# Patient Record
Sex: FEMALE | Race: BLACK OR AFRICAN AMERICAN
[De-identification: names, ages, dates, MRNs, and addresses within clinical notes are randomized per-mention and may not be internally consistent; named-entity substitution may affect disease eponyms.]

---

## 2019-10-07 ENCOUNTER — HOSPITAL ENCOUNTER (INPATIENT)
Dept: HOSPITAL 72 - EMR | Age: 34
LOS: 3 days | Discharge: HOME | DRG: 101 | End: 2019-10-10
Payer: COMMERCIAL

## 2019-10-07 VITALS — WEIGHT: 157.75 LBS | BODY MASS INDEX: 23.91 KG/M2 | HEIGHT: 68 IN

## 2019-10-07 VITALS — SYSTOLIC BLOOD PRESSURE: 131 MMHG | DIASTOLIC BLOOD PRESSURE: 75 MMHG

## 2019-10-07 VITALS — SYSTOLIC BLOOD PRESSURE: 126 MMHG | DIASTOLIC BLOOD PRESSURE: 69 MMHG

## 2019-10-07 VITALS — DIASTOLIC BLOOD PRESSURE: 81 MMHG | SYSTOLIC BLOOD PRESSURE: 118 MMHG

## 2019-10-07 VITALS — SYSTOLIC BLOOD PRESSURE: 120 MMHG | DIASTOLIC BLOOD PRESSURE: 75 MMHG

## 2019-10-07 DIAGNOSIS — K50.90: ICD-10-CM

## 2019-10-07 DIAGNOSIS — R56.9: Primary | ICD-10-CM

## 2019-10-07 DIAGNOSIS — E86.0: ICD-10-CM

## 2019-10-07 LAB
ADD MANUAL DIFF: YES
ALBUMIN SERPL-MCNC: 3.9 G/DL (ref 3.4–5)
ALBUMIN/GLOB SERPL: 1.1 {RATIO} (ref 1–2.7)
ALP SERPL-CCNC: 65 U/L (ref 46–116)
ALT SERPL-CCNC: 16 U/L (ref 12–78)
ANION GAP SERPL CALC-SCNC: 10 MMOL/L (ref 5–15)
APPEARANCE UR: CLEAR
APTT PPP: (no result) S
AST SERPL-CCNC: 13 U/L (ref 15–37)
BILIRUB SERPL-MCNC: 0.3 MG/DL (ref 0.2–1)
BUN SERPL-MCNC: 8 MG/DL (ref 7–18)
CALCIUM SERPL-MCNC: 8.8 MG/DL (ref 8.5–10.1)
CHLORIDE SERPL-SCNC: 104 MMOL/L (ref 98–107)
CO2 SERPL-SCNC: 26 MMOL/L (ref 21–32)
CREAT SERPL-MCNC: 0.8 MG/DL (ref 0.55–1.3)
ERYTHROCYTE [DISTWIDTH] IN BLOOD BY AUTOMATED COUNT: 10.6 % (ref 11.6–14.8)
GLOBULIN SER-MCNC: 3.7 G/DL
GLUCOSE UR STRIP-MCNC: NEGATIVE MG/DL
HCT VFR BLD CALC: 41.8 % (ref 37–47)
HGB BLD-MCNC: 13.9 G/DL (ref 12–16)
KETONES UR QL STRIP: NEGATIVE
LEUKOCYTE ESTERASE UR QL STRIP: NEGATIVE
MCV RBC AUTO: 96 FL (ref 80–99)
NITRITE UR QL STRIP: NEGATIVE
PH UR STRIP: 6.5 [PH] (ref 4.5–8)
PLATELET # BLD: 273 K/UL (ref 150–450)
POTASSIUM SERPL-SCNC: 4 MMOL/L (ref 3.5–5.1)
PROT UR QL STRIP: NEGATIVE
RBC # BLD AUTO: 4.36 M/UL (ref 4.2–5.4)
SODIUM SERPL-SCNC: 140 MMOL/L (ref 136–145)
SP GR UR STRIP: 1 (ref 1–1.03)
UROBILINOGEN UR-MCNC: NORMAL MG/DL (ref 0–1)
WBC # BLD AUTO: 9.5 K/UL (ref 4.8–10.8)

## 2019-10-07 PROCEDURE — 99285 EMERGENCY DEPT VISIT HI MDM: CPT

## 2019-10-07 PROCEDURE — 70553 MRI BRAIN STEM W/O & W/DYE: CPT

## 2019-10-07 PROCEDURE — 96361 HYDRATE IV INFUSION ADD-ON: CPT

## 2019-10-07 PROCEDURE — 93005 ELECTROCARDIOGRAM TRACING: CPT

## 2019-10-07 PROCEDURE — 81025 URINE PREGNANCY TEST: CPT

## 2019-10-07 PROCEDURE — 83690 ASSAY OF LIPASE: CPT

## 2019-10-07 PROCEDURE — 83735 ASSAY OF MAGNESIUM: CPT

## 2019-10-07 PROCEDURE — 86592 SYPHILIS TEST NON-TREP QUAL: CPT

## 2019-10-07 PROCEDURE — 96365 THER/PROPH/DIAG IV INF INIT: CPT

## 2019-10-07 PROCEDURE — 81003 URINALYSIS AUTO W/O SCOPE: CPT

## 2019-10-07 PROCEDURE — 80307 DRUG TEST PRSMV CHEM ANLYZR: CPT

## 2019-10-07 PROCEDURE — 70450 CT HEAD/BRAIN W/O DYE: CPT

## 2019-10-07 PROCEDURE — 36415 COLL VENOUS BLD VENIPUNCTURE: CPT

## 2019-10-07 PROCEDURE — 85007 BL SMEAR W/DIFF WBC COUNT: CPT

## 2019-10-07 PROCEDURE — 80299 QUANTITATIVE ASSAY DRUG: CPT

## 2019-10-07 PROCEDURE — 95819 EEG AWAKE AND ASLEEP: CPT

## 2019-10-07 PROCEDURE — 84703 CHORIONIC GONADOTROPIN ASSAY: CPT

## 2019-10-07 PROCEDURE — 86780 TREPONEMA PALLIDUM: CPT

## 2019-10-07 PROCEDURE — 80053 COMPREHEN METABOLIC PANEL: CPT

## 2019-10-07 PROCEDURE — 80048 BASIC METABOLIC PNL TOTAL CA: CPT

## 2019-10-07 PROCEDURE — 85025 COMPLETE CBC W/AUTO DIFF WBC: CPT

## 2019-10-07 PROCEDURE — 96375 TX/PRO/DX INJ NEW DRUG ADDON: CPT

## 2019-10-07 RX ADMIN — ENOXAPARIN SODIUM SCH MG: 40 INJECTION SUBCUTANEOUS at 21:15

## 2019-10-07 NOTE — NUR
ED Nurse Note:



Pt continues to rest quietly in bed, awake and alert, IV site patent, denies 
cp, sob or any acute chagnes, no sz activity noted, report given to FATOUMATA Cordova on 
floor unit, pt being taken to floor via gurney with RN and ER-Tech under ACLS 
guidelines, nad noted during pt transport.

## 2019-10-07 NOTE — NUR
NURSE NOTES:

rECEIVED PT FROM catracho Cooney VIA PHONE. Pt arrived awake, alert, and talkative. Family at 
bedside. Skin intact. Pt admits to drug use. Bed in lowest position. Call light within 
reach. Will continue to monitor.

## 2019-10-07 NOTE — EMERGENCY ROOM REPORT
History of Present Illness


General


Chief Complaint:  Syncope


Source:  EMS





Present Illness


HPI


33-year-old female presents ED for evaluation.  Patient brought in by EMS.  

States that while at work today she started to feel lightheaded and fell to the 

ground.  Witnessed syncope per coworkers.  Patient presenting with headache and 

vomiting.  Headache is posterior, dull, 9 out of 10, nonradiating.  Denies 

photophobia blurry vision.  Denies neck pain.  History of Crohn's disease.  

States she is not taking any medication at this time.  States her last flareup 

was 2 years ago.  Denies any abdominal pain.  Denies any diarrhea.  No other 

aggravating relieving factors.  Denies any other associated symptoms


Allergies:  


Coded Allergies:  


     No Known Allergies (Unverified , 10/7/19)





Patient History


Past Surgical History:  none


Pertinent Family History:  none


Social History:  Denies: smoking, alcohol use, drug use


Last Menstrual Period:  09/09/19


Pregnant Now:  No


Immunizations:  UTD


Reviewed Nursing Documentation:  PMH: Agreed; PSxH: Agreed





Nursing Documentation-PMH


Past Medical History:  No History, Except For


Hx Gastrointestinal Problems:  Yes - chron diseases





Review of Systems


All Other Systems:  negative except mentioned in HPI





Physical Exam





Vital Signs








  Date Time  Temp Pulse Resp B/P (MAP) Pulse Ox O2 Delivery O2 Flow Rate FiO2


 


10/7/19 16:27 97.9 86 16 117/73 (88) 99 Room Air  








Sp02 EP Interpretation:  reviewed, normal


General Appearance:  no apparent distress, alert, GCS 15, non-toxic


Head:  normocephalic, atraumatic


Eyes:  bilateral eye normal inspection, bilateral eye PERRL


ENT:  hearing grossly normal, normal pharynx, no angioedema, normal voice


Neck:  full range of motion, supple, no meningismus, supple/symm/no masses


Respiratory:  chest non-tender, lungs clear, normal breath sounds, speaking 

full sentences


Cardiovascular #1:  regular rate, rhythm, no edema


Cardiovascular #2:  2+ carotid (R), 2+ carotid (L), 2+ radial (R), 2+ radial (L)

, 2+ dorsalis pedis (R), 2+ dorsalis pedis (L)


Gastrointestinal:  normal bowel sounds, non tender, soft, non-distended, no 

guarding, no rebound


Rectal:  deferred


Genitourinary:  normal inspection, no CVA tenderness


Musculoskeletal:  back normal, gait/station normal, normal range of motion, non-

tender


Neurologic:  alert, oriented x3, responsive, motor strength/tone normal, 

sensory intact, speech normal


Psychiatric:  judgement/insight normal, memory normal, mood/affect normal, no 

suicidal/homicidal ideation


Reflexes:  3+ bicep (R), 3+ bicep (L), 3+ tricep (R), 3+ tricep (L), 3+ knee (R)

, 3+ knee (L)


Lymphatic:  no adenopathy





Medical Decision Making


Diagnostic Impression:  


 Primary Impression:  


 Syncope


 Qualified Codes:  R55 - Syncope and collapse


 Additional Impression:  


 New onset seizure


ER Course


Hospital Course 





34 yo F presents with nausea, syncope, headache. 





Differential diagnosis includes- dehydration, substance abuse, arrythmia





Clinical course


Patient placed on stretcher.  Initial history and physical I ordered labs, IV 

fluids, mediations, CT head 





Labs-electrolytes okay, no leukocytosis, hemoglobin/hematocrit stable.  UDS + 

THC, UA negative


EKG - NSr, no acute ischemic chanves interpreted by me 


CT Brain ok 





States she still feels nauseous and shaky.  Coworker at bedside states that 

patient had a seizure that she witnessed.  Saw foaming from the mouth.  No 

incontinence.  No tongue trauma.  Patient has no prior history of seizure. 





Given Keppra in ED.  Case discussed with Dr. Hale and he agreed to accept 

the patient to his service for further care and support.





i.  I feel this is a highly complex case requiring extensive working including 

EKG/Rhythm strip, Xray/CT/US, Blood/urine lab work, repeat exams while in ED, 


and administration of strong opiates/narcotics for pain control, admission to 

hospital or close patient follow up.  





Diagnosis - syncope, new onset seizure





admitted to telemetry in serious condition





Labs








Test


  10/7/19


17:00 10/7/19


18:15


 


White Blood Count


  9.5 K/UL


(4.8-10.8) 


 


 


Red Blood Count


  4.36 M/UL


(4.20-5.40) 


 


 


Hemoglobin


  13.9 G/DL


(12.0-16.0) 


 


 


Hematocrit


  41.8 %


(37.0-47.0) 


 


 


Mean Corpuscular Volume 96 FL (80-99)  


 


Mean Corpuscular Hemoglobin


  31.8 PG


(27.0-31.0) 


 


 


Mean Corpuscular Hemoglobin


Concent 33.3 G/DL


(32.0-36.0) 


 


 


Red Cell Distribution Width


  10.6 %


(11.6-14.8) 


 


 


Platelet Count


  273 K/UL


(150-450) 


 


 


Mean Platelet Volume


  6.6 FL


(6.5-10.1) 


 


 


Neutrophils (%) (Auto)  % (45.0-75.0)  


 


Lymphocytes (%) (Auto)  % (20.0-45.0)  


 


Monocytes (%) (Auto)  % (1.0-10.0)  


 


Eosinophils (%) (Auto)  % (0.0-3.0)  


 


Basophils (%) (Auto)  % (0.0-2.0)  


 


Differential Total Cells


Counted 100 


  


 


 


Neutrophils % (Manual) 83 % (45-75)  


 


Lymphocytes % (Manual) 12 % (20-45)  


 


Monocytes % (Manual) 5 % (1-10)  


 


Eosinophils % (Manual) 0 % (0-3)  


 


Basophils % (Manual) 0 % (0-2)  


 


Band Neutrophils 0 % (0-8)  


 


Platelet Estimate Adequate  


 


Platelet Morphology Normal  


 


Red Blood Cell Morphology Normal  


 


Sodium Level


  140 MMOL/L


(136-145) 


 


 


Potassium Level


  4.0 MMOL/L


(3.5-5.1) 


 


 


Chloride Level


  104 MMOL/L


() 


 


 


Carbon Dioxide Level


  26 MMOL/L


(21-32) 


 


 


Anion Gap


  10 mmol/L


(5-15) 


 


 


Blood Urea Nitrogen 8 mg/dL (7-18)  


 


Creatinine


  0.8 MG/DL


(0.55-1.30) 


 


 


Estimat Glomerular Filtration


Rate > 60 mL/min


(>60) 


 


 


Glucose Level


  161 MG/DL


() 


 


 


Calcium Level


  8.8 MG/DL


(8.5-10.1) 


 


 


Total Bilirubin


  0.3 MG/DL


(0.2-1.0) 


 


 


Aspartate Amino Transf


(AST/SGOT) 13 U/L (15-37) 


  


 


 


Alanine Aminotransferase


(ALT/SGPT) 16 U/L (12-78) 


  


 


 


Alkaline Phosphatase


  65 U/L


() 


 


 


Total Protein


  7.6 G/DL


(6.4-8.2) 


 


 


Albumin


  3.9 G/DL


(3.4-5.0) 


 


 


Globulin 3.7 g/dL  


 


Albumin/Globulin Ratio 1.1 (1.0-2.7)  


 


Lipase


  127 U/L


() 


 


 


Human Chorionic Gonadotropin,


Qual Negative


(NEGATIVE) 


 


 


Urine Color  Pale yellow 


 


Urine Appearance  Clear 


 


Urine pH  6.5 (4.5-8.0) 


 


Urine Specific Gravity


  


  1.005


(1.005-1.035)


 


Urine Protein


  


  Negative


(NEGATIVE)


 


Urine Glucose (UA)


  


  Negative


(NEGATIVE)


 


Urine Ketones


  


  Negative


(NEGATIVE)


 


Urine Blood


  


  Negative


(NEGATIVE)


 


Urine Nitrite


  


  Negative


(NEGATIVE)


 


Urine Bilirubin


  


  Negative


(NEGATIVE)


 


Urine Urobilinogen


  


  Normal MG/DL


(0.0-1.0)


 


Urine Leukocyte Esterase


  


  Negative


(NEGATIVE)


 


Urine HCG, Qualitative


  


  Negative


(NEGATIVE)


 


Urine Opiates Screen


  


  Positive


(NEGATIVE)


 


Urine Barbiturates Screen


  


  Negative


(NEGATIVE)


 


Phencyclidine (PCP) Screen


  


  Negative


(NEGATIVE)


 


Urine Amphetamines Screen


  


  Negative


(NEGATIVE)


 


Urine Benzodiazepines Screen


  


  Negative


(NEGATIVE)


 


Urine Cocaine Screen


  


  Negative


(NEGATIVE)


 


Urine Marijuana (THC) Screen


  


  Positive


(NEGATIVE)








EKG Diagnostic Results


Rate:  normal


Rhythm:  NSR


ST Segments:  no acute changes


ASA given to the pt in ED:  No





Rhythm Strip Diag. Results


EP Interpretation:  yes


Rhythm:  NSR, no PVC's, no ectopy





CT/MRI/US Diagnostic Results


CT/MRI/US Diagnostic Results :  


   Imaging Test Ordered:  CT head


   Impression


Impression:


-No acute intracranial abnormality.


-Bilateral maxillary sinus findings suggesting probable chronic sinusitis,


 likely acute on the left.


-Otherwise unremarkable study.





Last Vital Signs








  Date Time  Temp Pulse Resp B/P (MAP) Pulse Ox O2 Delivery O2 Flow Rate FiO2


 


10/7/19 16:27 97.9 86 16 117/73 (88) 99 Room Air  








Status:  improved


Disposition:  ADMITTED AS INPATIENT


Condition:  Serious


Scripts


No Active Prescriptions or Reported Meds











Lázaro Encinas MD Oct 7, 2019 17:08

## 2019-10-07 NOTE — NUR
ED Nurse Note:



Pt assisted to bathroom, ambulates well with steady gait, denies headache, or 
dizziness,r emains with mild abd pain, md aware, no cp, no sob, iv site patent, 
no sz activity noted, pt to be admitted to hospital, will resume care as 
ordered and continue to closely monitor.

## 2019-10-07 NOTE — NUR
ED Nurse Note:



Recieved report from am nurse to resume care, pt in bed awake, alert and 
oriented x 4, pt on cardiac monitoring, pt here for possible seizure activity 
with syncopal episode, pt has patent saline lock in left ac, side rails padded 
and placed on sz precautions, pt family at bedside, pt ahs hx of chrohns 
disease and has mild abd pain at 7/10 with nausea, will resume care as ordered 
and continue to closely monitor.

## 2019-10-08 VITALS — DIASTOLIC BLOOD PRESSURE: 92 MMHG | SYSTOLIC BLOOD PRESSURE: 141 MMHG

## 2019-10-08 VITALS — SYSTOLIC BLOOD PRESSURE: 116 MMHG | DIASTOLIC BLOOD PRESSURE: 82 MMHG

## 2019-10-08 VITALS — SYSTOLIC BLOOD PRESSURE: 120 MMHG | DIASTOLIC BLOOD PRESSURE: 78 MMHG

## 2019-10-08 VITALS — DIASTOLIC BLOOD PRESSURE: 79 MMHG | SYSTOLIC BLOOD PRESSURE: 122 MMHG

## 2019-10-08 LAB
ANION GAP SERPL CALC-SCNC: 5 MMOL/L (ref 5–15)
BUN SERPL-MCNC: 5 MG/DL (ref 7–18)
CALCIUM SERPL-MCNC: 8 MG/DL (ref 8.5–10.1)
CHLORIDE SERPL-SCNC: 109 MMOL/L (ref 98–107)
CO2 SERPL-SCNC: 28 MMOL/L (ref 21–32)
CREAT SERPL-MCNC: 0.7 MG/DL (ref 0.55–1.3)
POTASSIUM SERPL-SCNC: 3.3 MMOL/L (ref 3.5–5.1)
SODIUM SERPL-SCNC: 142 MMOL/L (ref 136–145)

## 2019-10-08 RX ADMIN — ENOXAPARIN SODIUM SCH MG: 40 INJECTION SUBCUTANEOUS at 20:34

## 2019-10-08 NOTE — HISTORY AND PHYSICAL REPORT
DATE OF ADMISSION:  10/07/2019

REASON FOR ADMISSION:  Seizure.



HISTORY OF PRESENT ILLNESS:  The patient is a 33-year-old female who

presented to the emergency room for further evaluation and care after

falling to the ground with having a witnessed seizure, which she says her

coworkers said it lasted about a minute.  There was a prodromal flare

where she made some aggravating noises, fell over to the side and had a

seizure for one minute.  Per her recollection of what her coworkers told

her.  The patient states she has never had this before.  The patient does

state she does marijuana on a regular basis by vaping and eating it and

also smoking in other routes.  However, at this time the patient stated

that she had not had some for some days.  She denies any nausea, vomiting,

or diarrhea.  No chest pain or shortness of breath.



PAST MEDICAL HISTORY:  Crohn disease.



ALLERGIES:  No known drug allergies.



PAST SURGICAL HISTORY:  None.



FAMILY HISTORY:  None.



SOCIAL HISTORY:  Positive for tobacco, alcohol, and marijuana.



REVIEW OF SYSTEMS:  NEUROLOGIC:  The patient had syncopal and seizure.

CARDIOVASCULAR:  No current chest pain, palpitations, or angina.

PULMONARY:  No difficulty breathing, productive cough, or sputum.

GASTROINTESTINAL/GENITOURINARY:  No change in urinary or bowel habits.  No

nausea, vomiting, or diarrhea.  ENDOCRINOLOGY:  No night sweats, fevers,

or chills.  MUSCULOSKELETAL:  The patient is feeling weak, tired, and

fatigued.



PHYSICAL EXAMINATION:

VITAL SIGNS:  Blood pressure 126/69, heart rate 76, temperature 98.4, and

respiratory rate 16.  Pulse oximetry 99% on room air.

GENERAL:  The patient is awake and alert, not in distress.

HEENT:  Extraocular muscles intact.  No lymphadenopathy noted.

CARDIOVASCULAR:  S1, S2.  No rubs or gallops.

PULMONARY:  Clear to auscultation bilaterally.  No rales, rhonchi, or

wheezes.

ABDOMEN:  Nondistended and nontender.

EXTREMITIES:  No edema.



LABORATORY DATA:  Labs dated October 7, 2019 - toxicology screen is

positive for marijuana and opioids.  Potassium 4, creatinine 0.8, sodium

140.  Hemoglobin 13.9, white cell count 9.5, and platelet count 273,000.



ASSESSMENT AND PLAN:

1. Seizure.  It could be secondary to heavy marijuana use.  At this

time, Neurology has been consulted.  We will defer further management to

them.

2. DVT prophylaxis with Lovenox.

3. GI prophylaxis with famotidine.

4. Dehydration.  Continue IV fluids.









  ______________________________________________

  Elder Hale MD





DR:  HDP/VR

D:  10/07/2019 22:08

T:  10/08/2019 02:29

JOB#:  [id number]

CC:

## 2019-10-08 NOTE — NUR
NURSE NOTES:

Received patient from FATOUMATA Cordova in bed, denies any pain at this time. Patient is AAO X4, able 
to make needs known. Denies any pain at this time. Bed is in lowest position, brakes engaged 
for safety. Call light is within reach. Will continue with the plan of care.

## 2019-10-08 NOTE — NUR
*-* INSURANCE *-*



ALL AVAILABLE CLINICALS HAVE BEEN FAXED TO:



[facility]

TRACKING#[id number] 

NO CM ASSIGNED YET 

#[phone]

FAX#[phone] REVIEWS/CLINICALS

## 2019-10-08 NOTE — NEPHROLOGY PROGRESS NOTE
Assessment/Plan


Assessment/Plan:


A/P





1)  Seizure- new onset


      - marijuanna use


      - DC once cleared by Neuro





2) DVT prophylaxsis with lovenox





Subjective


Date patient seen:  Oct 8, 2019


Time patient seen:  08:28


ROS Limited/Unobtainable:  No


Allergies:  


Coded Allergies:  


     No Known Allergies (Unverified , 10/7/19)


Subjective


Patient in no distress





Objective





Last 24 Hour Vital Signs








  Date Time  Temp Pulse Resp B/P (MAP) Pulse Ox O2 Delivery O2 Flow Rate FiO2


 


10/8/19 04:00  57      


 


10/8/19 00:00  62      


 


10/7/19 23:34      Room Air  


 


10/7/19 22:51  68  118/81 (93) 98   


 


10/7/19 22:25 98.4 71 16 131/75 100 Room Air  


 


10/7/19 21:00 98.4 71 16 131/75 100 Room Air  


 


10/7/19 19:30 98.4 76 16 126/69 99 Room Air  


 


10/7/19 17:40 97.9       


 


10/7/19 17:35 98.2 70 16 120/75 99 Room Air  


 


10/7/19 16:27 97.9 86 16 117/73 (88) 99 Room Air  

















Intake and Output  


 


 10/7/19 10/8/19





 19:00 07:00


 


Intake Total 1000 ml 


 


Balance 1000 ml 


 


  


 


Intake IV Total 1000 ml 


 


# Voids  1








Laboratory Tests


10/7/19 17:00: 


White Blood Count 9.5, Red Blood Count 4.36, Hemoglobin 13.9, Hematocrit 41.8, 

Mean Corpuscular Volume 96, Mean Corpuscular Hemoglobin 31.8H, Mean Corpuscular 

Hemoglobin Concent 33.3, Red Cell Distribution Width 10.6L, Platelet Count 273, 

Mean Platelet Volume 6.6, Neutrophils (%) (Auto) , Lymphocytes (%) (Auto) , 

Monocytes (%) (Auto) , Eosinophils (%) (Auto) , Basophils (%) (Auto) , 

Differential Total Cells Counted 100, Neutrophils % (Manual) 83H, Lymphocytes % 

(Manual) 12L, Monocytes % (Manual) 5, Eosinophils % (Manual) 0, Basophils % (

Manual) 0, Band Neutrophils 0, Platelet Estimate Adequate, Platelet Morphology 

Normal, Red Blood Cell Morphology Normal, Sodium Level 140, Potassium Level 4.0

, Chloride Level 104, Carbon Dioxide Level 26, Anion Gap 10, Blood Urea 

Nitrogen 8, Creatinine 0.8, Estimat Glomerular Filtration Rate > 60, Glucose 

Level 161H, Calcium Level 8.8, Total Bilirubin 0.3, Aspartate Amino Transf (AST/

SGOT) 13L, Alanine Aminotransferase (ALT/SGPT) 16, Alkaline Phosphatase 65, 

Total Protein 7.6, Albumin 3.9, Globulin 3.7, Albumin/Globulin Ratio 1.1, 

Lipase 127, Human Chorionic Gonadotropin, Qual Negative


10/7/19 18:15: 


Urine Color Pale yellow, Urine Appearance Clear, Urine pH 6.5, Urine Specific 

Gravity 1.005, Urine Protein Negative, Urine Glucose (UA) Negative, Urine 

Ketones Negative, Urine Blood Negative, Urine Nitrite Negative, Urine Bilirubin 

Negative, Urine Urobilinogen Normal, Urine Leukocyte Esterase Negative, Urine 

HCG, Qualitative Negative, Urine Opiates Screen PositiveH, Urine Barbiturates 

Screen Negative, Phencyclidine (PCP) Screen Negative, Urine Amphetamines Screen 

Negative, Urine Benzodiazepines Screen Negative, Urine Cocaine Screen Negative, 

Urine Marijuana (THC) Screen PositiveH


10/8/19 03:50: 


Sodium Level 142, Potassium Level 3.3L, Chloride Level 109H, Carbon Dioxide 

Level 28, Anion Gap 5, Blood Urea Nitrogen 5L, Creatinine 0.7, Estimat 

Glomerular Filtration Rate > 60, Glucose Level 91, Calcium Level 8.0L


Height (Feet):  5


Height (Inches):  8.00


Weight (Pounds):  135


General Appearance:  no apparent distress, alert


EENT:  normal ENT inspection


Neck:  normal alignment, supple


Cardiovascular:  normal rate, regular rhythm


Respiratory/Chest:  lungs clear, normal breath sounds


Abdomen:  non tender, soft


Edema:  no edema noted Arm (L), no edema noted Arm (R), no edema noted Leg (L), 

no edema noted Leg (R), no edema noted Pedal (L), no edema noted Pedal (R), no 

edema noted Generalized











Elder Hale MD Oct 8, 2019 08:29

## 2019-10-08 NOTE — CARDIOLOGY REPORT
--------------- APPROVED REPORT --------------





EKG Measurement

Heart [id number]

OK [id number]

[id number]

[id number]

[id number]





Normal sinus rhythm

Normal ECG

## 2019-10-08 NOTE — NUR
CASE MANAGEMENT:REVIEW



33 YR OLD FEMALE BIBA FROM WORK



CC: SYNCOPE. HEADACHE



SI: NEW ONSET SEIZURE

97.9   86  16   117/73  99% ON RA

GLUCOSE+161   URINE(+) OPIATES AND MARIHUANA



IS: IV REGLAN

1L NS BOLUS

IV MORPHINE 

IV KEPPRA

CT HEAD

**: TO TELEMETRY 



PLAN:

MRI BRAIN

EEG

SEIZURE PRECAUTION

## 2019-10-08 NOTE — NUR
NURSE NOTES:  Received patient from [facility].  Patient in bed, on room air, no signs of 
respiratory distress.  Padded side rails for seizure precaution.  Bed in low position, 
locked, call light within reach.  Patient is alert and oriented x4, no c/o pain.

## 2019-10-09 VITALS — SYSTOLIC BLOOD PRESSURE: 121 MMHG | DIASTOLIC BLOOD PRESSURE: 84 MMHG

## 2019-10-09 VITALS — SYSTOLIC BLOOD PRESSURE: 129 MMHG | DIASTOLIC BLOOD PRESSURE: 79 MMHG

## 2019-10-09 VITALS — DIASTOLIC BLOOD PRESSURE: 70 MMHG | SYSTOLIC BLOOD PRESSURE: 119 MMHG

## 2019-10-09 VITALS — DIASTOLIC BLOOD PRESSURE: 81 MMHG | SYSTOLIC BLOOD PRESSURE: 118 MMHG

## 2019-10-09 VITALS — DIASTOLIC BLOOD PRESSURE: 79 MMHG | SYSTOLIC BLOOD PRESSURE: 111 MMHG

## 2019-10-09 VITALS — DIASTOLIC BLOOD PRESSURE: 78 MMHG | SYSTOLIC BLOOD PRESSURE: 116 MMHG

## 2019-10-09 LAB
ANION GAP SERPL CALC-SCNC: 4 MMOL/L (ref 5–15)
BUN SERPL-MCNC: 4 MG/DL (ref 7–18)
CALCIUM SERPL-MCNC: 8.7 MG/DL (ref 8.5–10.1)
CHLORIDE SERPL-SCNC: 109 MMOL/L (ref 98–107)
CO2 SERPL-SCNC: 28 MMOL/L (ref 21–32)
CREAT SERPL-MCNC: 0.8 MG/DL (ref 0.55–1.3)
POTASSIUM SERPL-SCNC: 4.2 MMOL/L (ref 3.5–5.1)
SODIUM SERPL-SCNC: 141 MMOL/L (ref 136–145)

## 2019-10-09 RX ADMIN — ENOXAPARIN SODIUM SCH MG: 40 INJECTION SUBCUTANEOUS at 21:53
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
## 2019-10-09 NOTE — PROGRESS NOTE
DATE:  10/09/2019

SUBJECTIVE:  The patient last night had a seizure.  There was no actual

latent seizure _____ she had maximum things off of her bed.  The patient

has no recollection of the event.  There was no bitten tongue, myalgias,

incontinence, headache, or confusion.  This morning, she feels well.



PHYSICAL EXAMINATION:

MENTAL STATUS:  Her mental status was intact.  She did spell world

backwards and forwards.  Knows the date is 10/09/2019, it is Wednesday.

Language function is normal.  Cranial nerves II through XII are within

normal limits.

MUSCLE EXAMINATION:  Muscle bulk and tone are normal.  Strength 5/5

proximally and distally without pronator drift.  Coordination,

finger-to-nose, heel-to-shin testing, rapid alternating movements are

normal.



MRI scan of the brain without contrast done last night shows enhancement

and prominence of nasal turbinate and _____ maxillary sinus.  Her actual

brain is normal.  Diagnosis is nasal congestion _____ maxillary sinus

_____.  The x-ray of the foot reveals no acute findings.  The x-ray of the

ankle revealed no acute finding.



IMPRESSION:  It is unclear why this patient had another seizure.  I am

going to increase her Keppra to 1000 mg p.o. b.i.d.  She has not had an

EEG _____.  The patient after she is discharged _____.



PLAN:

1. The patient was warned about driving.

2. Increase Keppra to 1000 mg b.i.d.

3. EEG.









  ______________________________________________

  Mulugeta Zepeda MD





DR:  URVASHI

D:  10/09/2019 13:17

T:  10/09/2019 23:31

JOB#:  [id number]

CC:

## 2019-10-09 NOTE — NUR
NURSE NOTES:

Received patient from FATOUMATA Arredondo in bed, denies any pain at this time. Patient is AAO X4, all 
needs anticipated and met. Denies any pain at this time. Bed is in lowest position, brakes 
engaged for safety. Call light is within reach. Will continue with the plan of care.

## 2019-10-09 NOTE — NUR
*-* INSURANCE *-*



ALL AVAILABLE CLINICALS HAVE BEEN FAXED TO:



[facility]

TRACKING#[id number] 

NO CM ASSIGNED YET 

#[phone]

FAX#[phone] REVIEWS/CLINICALS

## 2019-10-09 NOTE — DIAGNOSTIC IMAGING REPORT
Indication: Foot pain

 

Comparison: None

 

Findings: 3  views of the left foot were obtained. 

 

No acute fractures, malalignment, erosions or periostitis are identified.  Soft

tissues are unremarkable.

 

 

Impression: No acute findings

## 2019-10-09 NOTE — NUR
NURSE NOTES:

Received pt and report from FATOUMATA Vasques. Observed pt resting in bed and eating dinner with 
friend/family member at bedside. Cardiac monitor is in placed, IV site intact, asymptomatic, 
and patent. Bed is in the lowest position and locked, call light within reach. No 
signs/symptoms of acute distress noted at this time. Will continue plan of care.

## 2019-10-09 NOTE — NUR
NURSE NOTES:  Patient asleep in bed, no signs of distress.  Bed in low position, locked, 
call light within reach.  Report given to Layo HAM.

## 2019-10-09 NOTE — DIAGNOSTIC IMAGING REPORT
Indication: Left ankle pain and injury

 

Comparison: None

 

Findings:

 

3 views of the left ankle obtained.

 

 

Soft tissues are unremarkable.  No acute fracture, malalignment, periostitis, or

osteochondral defects are identified. 

 

Impression: No acute findings

## 2019-10-09 NOTE — NEPHROLOGY PROGRESS NOTE
Assessment/Plan


Assessment/Plan:


A/P





1)  Seizure- new onset. Had episode last night


      - marijuanna use


      - w/u underway per Neuro


     - electrolytes corrected





2) DVT prophylaxsis with lovenox





Subjective


Date patient seen:  Oct 9, 2019


Time patient seen:  11:04


ROS Limited/Unobtainable:  No


Allergies:  


Coded Allergies:  


     No Known Allergies (Unverified , 10/7/19)


Subjective


Patient reported to have sz last night





Objective





Last 24 Hour Vital Signs








  Date Time  Temp Pulse Resp B/P (MAP) Pulse Ox O2 Delivery O2 Flow Rate FiO2


 


10/9/19 09:30  61      


 


10/9/19 09:00      Room Air  


 


10/9/19 08:00 98.9 61 18 116/78 (91) 98   


 


10/9/19 04:00  59      


 


10/9/19 04:00 98.6 63 16 119/70 (86) 98   


 


10/9/19 00:00 98.2 65 18 129/79 (96) 99   


 


10/9/19 00:00  67      


 


10/8/19 21:00      Room Air  


 


10/8/19 20:00  67      


 


10/8/19 20:00 98.2 102 18 141/92 (108) 98   


 


10/8/19 16:00 97.9 66 18 122/79 (93) 97   


 


10/8/19 16:00  66      


 


10/8/19 12:00 98.9 66 18 116/82 (93) 98   


 


10/8/19 12:00  66      

















Intake and Output  


 


 10/8/19 10/9/19





 19:00 07:00


 


Intake Total  400 ml


 


Balance  400 ml


 


  


 


Intake Oral  400 ml


 


# Voids  2


 


# Bowel Movements 1 1








Laboratory Tests


10/8/19 15:54: 


Rapid Plasma Reagin Non reactive, Treponema pallidum Ab (FTA-ABS) [Pending]


Height (Feet):  5


Height (Inches):  8.00


Weight (Pounds):  157


General Appearance:  no apparent distress, alert


EENT:  normal ENT inspection


Neck:  normal alignment, supple


Cardiovascular:  normal rate, regular rhythm


Respiratory/Chest:  lungs clear, normal breath sounds


Abdomen:  non tender, soft


Edema:  no edema noted Arm (L), no edema noted Arm (R), no edema noted Leg (L), 

no edema noted Leg (R), no edema noted Pedal (L), no edema noted Pedal (R), no 

edema noted Generalized











Elder Hale MD Oct 9, 2019 11:05

## 2019-10-09 NOTE — DIAGNOSTIC IMAGING REPORT
Indication: Seizure

 

Technique: The head was imaged in a 1.5 Janie magnet. Sequences obtained include

sagittal and axial T1 FLAIR, axial T2 fast spin echo with fat saturation, axial T2

FLAIR, diffusion and ADC map. Gadolinium-enhanced axial and coronal T1 FLAIR obtained

also.

 

Comparison: None

 

Findings: 

 

The size, contour, and configuration of the sulci, ventricles, and basal cisterns

appear normal. Gray-white differentiation is normal.

 

 There is no restricted diffusion. There is no mass effect, midline shift, edema, or

hemorrhage. There are no abnormal extra-axial or intra-axial fluid collections. 

 

The corpus callosum is unremarkable. The brainstem and cerebellum are unremarkable.

The sella is unremarkable. Bone marrow signal within the visualized osseous

structures appears age appropriate and unremarkable otherwise. There is a enhancement

and prominence of the left nasal turbinate and T2 hyperintense signal in the left

maxillary sinus.

 

No abnormal enhancement is identified.

 

Impression: Negative MRI brain with and without contrast.

 

Nasal congestion and left maxillary sinusitis

## 2019-10-09 NOTE — NUR
CASE MANAGEMENT:



10/9/19

SI: NEW ONSET SEIZURE

***HAD SEIZURE EPISODE LAST NIGHT

98.2   68  18  118/81  97% ON RA



IS: KEPPRA PO Q12

PEPCID PO QD

LOVENOX SQ QHS

**: TO TELEMETRY 



PLAN:

## 2019-10-10 VITALS — SYSTOLIC BLOOD PRESSURE: 120 MMHG | DIASTOLIC BLOOD PRESSURE: 64 MMHG

## 2019-10-10 VITALS — SYSTOLIC BLOOD PRESSURE: 139 MMHG | DIASTOLIC BLOOD PRESSURE: 83 MMHG

## 2019-10-10 VITALS — SYSTOLIC BLOOD PRESSURE: 108 MMHG | DIASTOLIC BLOOD PRESSURE: 69 MMHG

## 2019-10-10 VITALS — DIASTOLIC BLOOD PRESSURE: 72 MMHG | SYSTOLIC BLOOD PRESSURE: 117 MMHG

## 2019-10-10 LAB
ANION GAP SERPL CALC-SCNC: 6 MMOL/L (ref 5–15)
BUN SERPL-MCNC: 9 MG/DL (ref 7–18)
CALCIUM SERPL-MCNC: 8.4 MG/DL (ref 8.5–10.1)
CHLORIDE SERPL-SCNC: 107 MMOL/L (ref 98–107)
CO2 SERPL-SCNC: 29 MMOL/L (ref 21–32)
CREAT SERPL-MCNC: 0.8 MG/DL (ref 0.55–1.3)
POTASSIUM SERPL-SCNC: 3.5 MMOL/L (ref 3.5–5.1)
SODIUM SERPL-SCNC: 142 MMOL/L (ref 136–145)

## 2019-10-10 NOTE — NUR
CASE MANAGEMENT:



10/10/19

SI: NEW ONSET SEIZURE

***HAD SEIZURE EPISODE LAST NIGHT

98.6   70  18  139/83  98% ON RA



IS: KEPPRA PO Q12

PEPCID PO QD

LOVENOX SQ QHS

**: TO TELEMETRY 



PLAN:

EEG COMPLETED ~ INTERPRETATION PENDING

DISCHARGE PENDING CLEARANCE FROM NEUROLOGIST

## 2019-10-10 NOTE — DISCHARGE INSTRUCTIONS
Discharge Instructions


Discharge Instructions


Services at Discharge:  day care


Diet:  regular


Resume Normal Activity?:  Yes


Activity:  light activity


Follow Up Orders


Follow up 1 week with Neurologist


Patient has decided to stay with family/friends for 1-2 weeks post DC





For Congestive Heart Failure


Reminder


Report to your physician any weight gain of 5 pounds or more in one week.











Elder Hale MD Oct 10, 2019 13:31

## 2019-10-10 NOTE — NUR
NURSE NOTES:

Pt is asleep in bed with both eyes closed. No signs/symptoms of acute distress noted at this 
time. Will continue plan of care.

## 2019-10-10 NOTE — NUR
NURSE NOTES:

Patient discharged Home via private vehicle. Discharge instructions given to patient. 
Prescription filled at [facility]. IV removed. Patient stable upon discharge.

## 2019-10-10 NOTE — NEPHROLOGY PROGRESS NOTE
Assessment/Plan


Assessment/Plan:


A/P





1)  Seizure- new onset. etiology unclear


      - marijuanna use


      - w/u underway per Neuro. CT/MRu neg.EEG results pending


     - Keppra increased


     - DC today if cleared by Neuro





2) DVT prophylaxsis with lovenox





Subjective


Date patient seen:  Oct 10, 2019


Time patient seen:  13:27


ROS Limited/Unobtainable:  No


Allergies:  


Coded Allergies:  


     No Known Allergies (Unverified , 10/7/19)


Subjective


Patienthad EEG last nite





Objective





Last 24 Hour Vital Signs








  Date Time  Temp Pulse Resp B/P (MAP) Pulse Ox O2 Delivery O2 Flow Rate FiO2


 


10/10/19 12:00 98.5 60 18 108/69 (82) 97   


 


10/10/19 08:30      Room Air  


 


10/10/19 08:00 98.6 70 18 139/83 (101) 98   


 


10/10/19 08:00  76      


 


10/10/19 04:00  58      


 


10/10/19 04:00 97.8 57 17 117/72 (87) 98   


 


10/10/19 00:00 98.3 62 17 120/64 (82) 99   


 


10/10/19 00:00  61      


 


10/9/19 21:00      Room Air  


 


10/9/19 20:00 98.9 74 17 111/79 (90) 97   


 


10/9/19 20:00  79      


 


10/9/19 16:00 98.9 69 18 121/84 (96) 98   


 


10/9/19 16:00  69      

















Intake and Output  


 


 10/9/19 10/10/19





 19:00 07:00


 


Intake Total  390 ml


 


Balance  390 ml


 


  


 


Intake Oral  390 ml


 


# Voids  1


 


# Bowel Movements 1 








Laboratory Tests


10/10/19 06:50: 


Sodium Level 142, Potassium Level 3.5, Chloride Level 107, Carbon Dioxide Level 

29, Anion Gap 6, Blood Urea Nitrogen 9, Creatinine 0.8, Estimat Glomerular 

Filtration Rate > 60, Glucose Level 91, Calcium Level 8.4L


Height (Feet):  5


Height (Inches):  8.00


Weight (Pounds):  157


General Appearance:  no apparent distress, alert


EENT:  normal ENT inspection


Neck:  normal alignment, supple


Cardiovascular:  normal rate, regular rhythm


Respiratory/Chest:  lungs clear, normal breath sounds


Abdomen:  non tender, soft


Edema:  no edema noted Arm (L), no edema noted Arm (R), no edema noted Leg (L), 

no edema noted Leg (R), no edema noted Pedal (L), no edema noted Pedal (R), no 

edema noted Generalized











Elder Hale MD Oct 10, 2019 13:28

## 2019-10-10 NOTE — NUR
NURSE NOTES:

Patient is alert and oriented. No reports of discomfort. Side rails are padded, side rails 
are up x2, bed is locked, and call light is within reach. Will continue to monitor.

## 2019-10-11 NOTE — NUR
*-* INSURANCE *-*



DISCHARGE SUMMARY HAS BEEN FAXED TO:



[facility]

TRACKING#[id number] 

NO CM ASSIGNED YET 

#[phone]

FAX#[phone] REVIEWS/CLINICALS

## 2019-10-11 NOTE — DISCHARGE SUMMARY
Discharge Summary


Discharge Summary


_


DATE OF ADMISSION: 10/7/2019





DATE OF DISCHARGE: 10/10/2019








DISCHARGED BY: Dr. Hale





REASON FOR ADMISSION: 


33 years old female with history of chronic disease,, presented to emergency 

room for evaluation.  


Patient was brought by EMS.  


Patient apparently was at work,  when she felt lightheaded and fell to the 

ground.  


Coworkers witnessed syncopal episode.  


Patient presented with   headache and vomiting .


Headache described as   dull,  9 out of 10 , nonradiating , located 

posteriorly.  


She denied photophobia and blurry vision.  


She denied neck pain.


Patient reported flare of Crohn's disease 2 years ago.  Patient  was not on any 

medication at this time.  


She denied abdominal pain.  She denied diarrhea.  


Coworker at the bedside reported that patient had seizure episode,  that she 

witnessed.  


She saw foaming from her mouth.  No incontinence.  No tongue trauma.  


No prior history of seizure.  


Upon evaluation vital signs were stable.  


Laboratory work-up revealed no leukocytosis,  hemoglobin 13.9,  hematocrit 

41.8.  Platelet count 273.


Stable electrolytes and renal parameters.  Glucose 161.  Stable LFT and lipase.

  


Urine pregnancy test  was negative.  


Urinalysis unremarkable.  


Urine toxicology screen was positive for marijuana and opiates.  


EKG revealed normal sinus rhythm , no acute ischemic changes.  


CT of the head revealed no acute intracranial bleeding or mass-effect.  


Sinus disease noted.  


In emergency department patient received a loading dose of Keppra in the 

emergency room and admitted to telemetry floor for further management.





CONSULTANTS:


neurologist Dr. Zepeda


 


 


 


[facility] COURSE: 


Patient admitted to monitored floor and  started on the IV hydration.


Seizure precaution maintained.


Neurology consulted.


DVT and GI prophylaxis provided.


MRI of the brain was negative with and without contrast.  Nasal congestion and 

left maxillary sinusitis noted.  


Patient remained in sinus rhythm on telemetry.  


Per neurologist , patient had one seizure , cause of which was unclear.  


He  recommended EEG.  


Patient cannot drive at this time.  


Patient started on routine Keppra 500 mg twice daily . 


Neurologist  initially   recommended to taper it over the next week or 2, since 

generally no treatment for one seizure necessarily.  


Patient already had 1 g of Keppra and needed to be gradually taper off Keppra.  


Calcium  and magnesium were corrected.  


RPR nonreactive.


X-ray of the left foot and left ankle revealed no acute findings.





Patient had another seizure  at night.  


Patient had no recollection of the event , no bitten tongue, some  myalgia, no 

confusion, but weakness .  


Per neurologist it was unclear why the patient had another seizure. 


Keppra was increased to  1000 milligrams twice daily.  


EEG was completed, results pending at the time of this dictation. 





Patient was stable for discharge home. Prescription provided. 


Patient to follow-up with neurologist as outpatient.  Patient was warned not to 

drive.





FINAL DIAGNOSES: 


New onset of seizures


Dehydration





DISCHARGE MEDICATIONS:


See Medication Reconciliation list.





DISCHARGE INSTRUCTIONS:


Patient was discharged home.  


Follow up with primary care provider in one week.


Follow up with neurologist as outpatient.  No driving.





I have been assigned to dictate discharge summary for this account. 


I was not involved in the patient's management.











Susana Ibarra NP Oct 11, 2019 11:45

## 2022-04-29 NOTE — NUR
ED Nurse Note:



Pt medicated for nausea, meds effective, remains with mild pain at 7/10, MD 
aware, pt has room for admission, attempted report called to FATOUMATA Cordova, informed 
bed is not ready and giving meds, will wait for return call, pt continues to 
rest in bed,a wake and alert, no sz activity noted, saline lock intact and 
patent, no changes or increased distress, will continue to monitor. [[id number]]